# Patient Record
Sex: FEMALE | Race: WHITE | NOT HISPANIC OR LATINO | Employment: FULL TIME | URBAN - METROPOLITAN AREA
[De-identification: names, ages, dates, MRNs, and addresses within clinical notes are randomized per-mention and may not be internally consistent; named-entity substitution may affect disease eponyms.]

---

## 2019-03-15 ENCOUNTER — TELEPHONE (OUTPATIENT)
Dept: PULMONOLOGY | Facility: CLINIC | Age: 49
End: 2019-03-15

## 2019-03-15 DIAGNOSIS — Z01.818 PRE-OP EXAM: Primary | ICD-10-CM

## 2019-03-20 ENCOUNTER — OFFICE VISIT (OUTPATIENT)
Dept: PULMONOLOGY | Facility: CLINIC | Age: 49
End: 2019-03-20
Payer: COMMERCIAL

## 2019-03-20 ENCOUNTER — CLINICAL SUPPORT (OUTPATIENT)
Dept: PULMONOLOGY | Facility: CLINIC | Age: 49
End: 2019-03-20
Payer: COMMERCIAL

## 2019-03-20 ENCOUNTER — HOSPITAL ENCOUNTER (OUTPATIENT)
Dept: RADIOLOGY | Facility: HOSPITAL | Age: 49
Discharge: HOME | End: 2019-03-20
Attending: NURSE PRACTITIONER
Payer: COMMERCIAL

## 2019-03-20 VITALS
SYSTOLIC BLOOD PRESSURE: 106 MMHG | HEART RATE: 73 BPM | RESPIRATION RATE: 16 BRPM | BODY MASS INDEX: 25.14 KG/M2 | OXYGEN SATURATION: 98 % | HEIGHT: 63 IN | DIASTOLIC BLOOD PRESSURE: 68 MMHG | WEIGHT: 141.88 LBS

## 2019-03-20 DIAGNOSIS — I25.10 CORONARY ARTERY DISEASE INVOLVING NATIVE CORONARY ARTERY WITHOUT ANGINA PECTORIS, UNSPECIFIED WHETHER NATIVE OR TRANSPLANTED HEART: ICD-10-CM

## 2019-03-20 DIAGNOSIS — I47.9 PAROXYSMAL TACHYCARDIA: ICD-10-CM

## 2019-03-20 DIAGNOSIS — J45.20 MILD INTERMITTENT ASTHMA WITHOUT COMPLICATION: Primary | ICD-10-CM

## 2019-03-20 DIAGNOSIS — E78.2 MIXED HYPERLIPIDEMIA: ICD-10-CM

## 2019-03-20 DIAGNOSIS — Z01.818 PRE-OP EXAM: ICD-10-CM

## 2019-03-20 LAB
BRPFT: ABNORMAL
FEF 25 75 CHG: 59.7 %
FEF 25 75 LLN: 1.62
FEF 25 75 POST REF: 83.6 %
FEF 25 75 PRE REF: 52.4 %
FEF 25 75 REF: 2.81
FET100 CHG: -17.7 %
FEV1 CHG: 26 %
FEV1 FVC CHG: 12.9 %
FEV1 FVC LLN: 70
FEV1 FVC POST REF: 103 %
FEV1 FVC PRE REF: 91.2 %
FEV1 FVC REF: 81
FEV1 LLN: 2.16
FEV1 POST REF: 83.4 %
FEV1 PRE REF: 66.2 %
FEV1 REF: 2.75
FEV6 CHG: 11.5 %
FEV6 LLN: 2.75
FEV6 POST REF: 80.5 %
FEV6 POST: 2.75 L (ref 2.75–4.07)
FEV6 PRE REF: 72.2 %
FEV6 PRE: 2.46 L (ref 2.75–4.07)
FEV6 REF: 3.41
FVC CHG: 11.5 %
FVC LLN: 2.69
FVC POST REF: 80.6 %
FVC PRE REF: 72.2 %
FVC REF: 3.41
PEF CHG: 66.1 %
PEF LLN: 5.04
PEF POST REF: 77.6 %
PEF PRE REF: 46.7 %
PEF REF: 6.7
POST FEF 25 75: 2.35 L/S (ref 1.62–4.3)
POST FET 100: 3.64 SEC
POST FEV1 FVC: 83.54 % (ref 70.09–90.35)
POST FEV1: 2.29 L (ref 2.16–3.32)
POST FVC: 2.75 L (ref 2.69–4.16)
POST PEF: 5.2 L/S (ref 5.04–8.36)
PRE FEF 25 75: 1.47 L/S (ref 1.62–4.3)
PRE FET 100: 4.42 SEC
PRE FEV1 FVC: 73.97 % (ref 70.09–90.35)
PRE FEV1: 1.82 L (ref 2.16–3.32)
PRE FVC: 2.46 L (ref 2.69–4.16)
PRE PEF: 3.13 L/S (ref 5.04–8.36)

## 2019-03-20 PROCEDURE — 99204 PR OFFICE/OUTPT VISIT, NEW, LEVL IV, 45-59 MIN: ICD-10-PCS | Mod: 25,S$GLB,, | Performed by: NURSE PRACTITIONER

## 2019-03-20 PROCEDURE — 3008F BODY MASS INDEX DOCD: CPT | Mod: CPTII,S$GLB,, | Performed by: NURSE PRACTITIONER

## 2019-03-20 PROCEDURE — 99204 OFFICE O/P NEW MOD 45 MIN: CPT | Mod: 25,S$GLB,, | Performed by: NURSE PRACTITIONER

## 2019-03-20 PROCEDURE — 94060 EVALUATION OF WHEEZING: CPT | Mod: S$GLB,,, | Performed by: INTERNAL MEDICINE

## 2019-03-20 PROCEDURE — 71046 XR CHEST PA AND LATERAL: ICD-10-PCS | Mod: 26,,, | Performed by: RADIOLOGY

## 2019-03-20 PROCEDURE — 94060 PR EVAL OF BRONCHOSPASM: ICD-10-PCS | Mod: S$GLB,,, | Performed by: INTERNAL MEDICINE

## 2019-03-20 PROCEDURE — 99999 PR PBB SHADOW E&M-EST. PATIENT-LVL III: CPT | Mod: PBBFAC,,, | Performed by: NURSE PRACTITIONER

## 2019-03-20 PROCEDURE — 71046 X-RAY EXAM CHEST 2 VIEWS: CPT | Mod: TC

## 2019-03-20 PROCEDURE — 71046 X-RAY EXAM CHEST 2 VIEWS: CPT | Mod: 26,,, | Performed by: RADIOLOGY

## 2019-03-20 PROCEDURE — 99999 PR PBB SHADOW E&M-EST. PATIENT-LVL III: ICD-10-PCS | Mod: PBBFAC,,, | Performed by: NURSE PRACTITIONER

## 2019-03-20 PROCEDURE — 3008F PR BODY MASS INDEX (BMI) DOCUMENTED: ICD-10-PCS | Mod: CPTII,S$GLB,, | Performed by: NURSE PRACTITIONER

## 2019-03-20 RX ORDER — ATORVASTATIN CALCIUM 80 MG/1
TABLET, FILM COATED ORAL
Refills: 5 | COMMUNITY
Start: 2019-02-23

## 2019-03-20 RX ORDER — ALBUTEROL SULFATE 90 UG/1
2 AEROSOL, METERED RESPIRATORY (INHALATION) EVERY 4 HOURS PRN
Qty: 8.5 INHALER | Refills: 5 | Status: SHIPPED | OUTPATIENT
Start: 2019-03-20 | End: 2020-03-23 | Stop reason: SDUPTHER

## 2019-03-20 NOTE — PROGRESS NOTES
Patient Active Problem List   Diagnosis    Hyperlipidemia    Arrhythmia    Coronary artery disease    Asthma     Chief Complaint   Patient presents with    Pre-op Exam    Asthma     Subjective:       Mahogany Gaviria is a 48 y.o. female who presents for evaluation of asthma related to planned surgical procedure Hysterectomy with Dr. Roya Zarate. No date scheduled, pending pulmonary and cardiac clearance.   Last seen in pulmonary clinic by Liz LeJeune NP in May 2015. Seen prior by Dr. Karimi 2012 with positive bronchodilator response on paty with new diagnosis of Asthma.  Diagnosed with Asthma at age 41 in 2012.   The patient is currently have symptoms of mild residual cough related to episode of uri with cough and wheezing 2 weeks ago, treated in an Urgent Care 2 weeks ago with steroid IM and antibiotic, reports improving, mild residual cough productive of clear and intermittent sinus congestion. Her friends tell her they hear wheeze intermittent, but she does not notice a sustained or intermittent wheeze.   The patient has not been having asthma flares over past at least 5 years.   She finds she has nasal allergy flare in Spring and rarely in fall with sinus symptoms, typically controlled with OTC antihistamines such as zyrtec or Claritin. Nasacort nasal spray taken daily in Spring, presently on Nasacort .   Symptoms in previous episodes have included dyspnea, productive cough and wheezing, and typically last 2 weeks.   Previous episodes have been triggered by pollens and upper respiratory infection.   Treatments tried during prior episodes include short-acting inhaled beta-adrenergic agonists and systemic corticosteroids, which usually provides complete resolution of symptoms. Past asthma treatment Qvar ICS, never stayed on longer than a month.   Current Disease Severity  Mahogany has weekly daytime asthma symptoms. She has no nighttime asthma symptoms.   The patient is using short-acting beta agonists for  symptom control about twice month since 2013.  She has exacerbations requiring oral systemic corticosteroids 1 times per year.   Current limitations in activity from asthma: none.   Number of days of school or work missed in the last month: 0.   Number of urgent/emergent visit in last year: 1 after hours visit for recent sinus/bronchitis flare 2 weeks ago.  ACT score: 21, asthma scores 19 or greater indicate well controlled asthma.     Past Medical History: The following portions of the patient's history were reviewed and updated as appropriate:   She  has a past surgical history that includes carpel tunnel release (Bilateral, 2013) and Endometrial ablation (2001).  Her family history includes Asthma in her paternal grandmother; Colon cancer in her maternal grandfather; Emphysema in her paternal grandmother; Heart attacks under age 50 in her paternal grandfather; Heart disease in her father; Hyperlipidemia in her sister and sister; Osteoarthritis in her mother; Stomach cancer in her maternal grandmother.  She  reports that  has never smoked. she has never used smokeless tobacco. She reports that she drinks alcohol. She reports that she does not use drugs.  She has a current medication list which includes the following prescription(s): albuterol, atorvastatin, and fluticasone furoate.  She has No Known Allergies.    Review of Systems  Review of Systems   Constitutional: Negative for fever, chills, weight loss, weight gain, activity change, appetite change, fatigue and night sweats.   HENT: Negative for postnasal drip, rhinorrhea, sinus pressure, voice change and congestion.    Eyes: Negative for redness and itching.   Respiratory: Negative for snoring, cough, sputum production, chest tightness, shortness of breath, wheezing, orthopnea, asthma nighttime symptoms, dyspnea on extertion, use of rescue inhaler and somnolence.    Cardiovascular: Negative.  Negative for chest pain, palpitations and leg swelling.  "  Genitourinary: Negative for difficulty urinating and hematuria.   Endocrine: Negative for cold intolerance and heat intolerance.    Musculoskeletal: Negative for arthralgias, gait problem, joint swelling and myalgias.   Skin: Negative.    Gastrointestinal: Negative for nausea, vomiting, abdominal pain and acid reflux.   Neurological: Negative for dizziness, weakness, light-headedness and headaches.   Hematological: Negative for adenopathy. No excessive bruising.   All other systems reviewed and are negative.    Objective:     /68   Pulse 73   Resp 16   Ht 5' 3" (1.6 m)   Wt 64.4 kg (141 lb 13.9 oz)   SpO2 98%   BMI 25.13 kg/m²   Physical Exam   Constitutional: She is oriented to person, place, and time. She appears well-developed and well-nourished. She is active and cooperative.  Non-toxic appearance. She does not appear ill. No distress.   HENT:   Head: Normocephalic.   Right Ear: External ear normal.   Left Ear: External ear normal.   Nose: Nose normal.   Mouth/Throat: Oropharynx is clear and moist. No oropharyngeal exudate.   Eyes: Conjunctivae are normal.   Neck: Normal range of motion. Neck supple.   Cardiovascular: Normal rate, regular rhythm, normal heart sounds and intact distal pulses.   Pulmonary/Chest: Effort normal and breath sounds normal. No stridor.   Abdominal: Soft.   Musculoskeletal: Normal range of motion.   Lymphadenopathy:     She has no cervical adenopathy.   Neurological: She is alert and oriented to person, place, and time.   Skin: Skin is warm and dry.   Psychiatric: She has a normal mood and affect. Her behavior is normal. Judgment and thought content normal.   Vitals reviewed.    Diagnostic Testing Reviewed  All relevant imaging and labs reviewed.  X-Ray Chest PA And Lateral  Narrative: EXAMINATION:  XR CHEST PA AND LATERAL    CLINICAL HISTORY:  Encounter for other preprocedural examination    TECHNIQUE:  PA and lateral views of the chest were " performed.    COMPARISON:  None    FINDINGS:  Cardiac silhouette and mediastinal contours are normal.  Lungs are clear.  Osseous structures are intact.  Impression: No acute cardiopulmonary process.    Electronically signed by: Mac Servin MD  Date:    03/20/2019  Time:    09:35    3/20/2019   Spirometry   Flow volume loops are normal.   Small airways disease: FEF 25-75% reduced.   Improvement in airflow following bronchodilator therapy suggests an asthmatic component.     Assessment:     1. Mild intermittent asthma without complication    2. Mixed hyperlipidemia    3. Paroxysmal tachycardia    4. Coronary artery disease involving native coronary artery without angina pectoris, unspecified whether native or transplanted heart      Orders Placed This Encounter   Procedures    Spirometry without Bronchodilator     Standing Status:   Future     Standing Expiration Date:   3/20/2020     Plan:     Problem List Items Addressed This Visit     Arrhythmia     Followed by cardiology, Dr. Ramesh Alejo cardiologist in [place]. prior on beta blockers, controlled off meds since 2015.            Asthma - Primary     Dx at age 41 years by Dr. Tellis [facility] in 2012. Mild intermittent.  3/21/2019 Normal spirometry with positive bronchodilator response.   Intermittent wheezing and flare 2 weeks ago requiring steroid  with upcoming anesthesia planned, begin ICS controlled until surgery completed and no flares of wheezing.   Follow up in 5 weeks for reevaluation.          Relevant Medications    albuterol (PROAIR HFA) 90 mcg/actuation inhaler    fluticasone furoate (ARNUITY ELLIPTA) 100 mcg/actuation DsDv    Other Relevant Orders    Spirometry without Bronchodilator    Coronary artery disease     Followed by CardiologistRamesh MD           Hyperlipidemia     Followed by cardiologistRamesh MD              RECOMMENDATIONS:   Surgical pulmonary risk have been discussed with patient who expressed and verbalized  understanding.   Based on my assessment, with recent bronchitis/asthma flare 2 weeks ago requiring steroid IM therapy with mild residual intermittent wheezing and productive cough it would be best to begin ICS controller for 4 weeks to in hopes of alleviating intermittent wheezing to better prepare for good surgical outcomes with regard to Asthma.   Recommend to plan elective surgical intervention with hysterectomy in about 6 weeks.   Plan reevaluation of acute asthma/bronchitis flare with pulmonary in 5 weeks, if patient is back at her baseline sooner she has option to follow up sooner for reevaluation.     Follow-up in about 5 weeks (around 4/24/2019).      Thank you for the opportunity to participate in the care of this patient.

## 2019-03-20 NOTE — LETTER
March 21, 2019      Roya Zarate MD  [address]  [address]  [facility] [address]           [facility]  [address] [place] [id number]  Phone: [phone]  Fax: [phone]          Patient: Mahogany Gaviria   MR Number: [id number]   YOB: 1970   Date of Visit: 3/20/2019       Dear Dr. Roya Zarate:    Thank you for referring Mahogany Gaviria to me for evaluation. Attached you will find relevant portions of my assessment and plan of care.    If you have questions, please do not hesitate to call me. I look forward to following Mahogany Gaviria along with you.    Sincerely,    Ema Martinez, NP    Enclosure  CC:  No Recipients    If you would like to receive this communication electronically, please contact [email or web address] or [phone] to request more information on [facility] access.    For providers and/or their staff who would like to refer a patient to [facility], please contact us through our one-stop-shop provider referral line, [facility] , at [phone].    If you feel you have received this communication in error or would no longer like to receive these types of communications, please e-mail [email or web address]

## 2019-03-21 NOTE — ASSESSMENT & PLAN NOTE
Dx at age 41 years by Dr. Tellis [facility] in 2012. Mild intermittent.  3/21/2019 Normal spirometry with positive bronchodilator response.   Intermittent wheezing and flare 2 weeks ago requiring steroid  with upcoming anesthesia planned, begin ICS controlled until surgery completed and no flares of wheezing.   Follow up in 5 weeks for reevaluation.

## 2019-03-21 NOTE — ASSESSMENT & PLAN NOTE
Followed by cardiology, Dr. Ramesh Alejo cardiologist in [place]. prior on beta blockers, controlled off meds since 2015.

## 2019-03-22 ENCOUNTER — TELEPHONE (OUTPATIENT)
Dept: PULMONOLOGY | Facility: CLINIC | Age: 49
End: 2019-03-22

## 2019-03-22 NOTE — TELEPHONE ENCOUNTER
----- Message from Leonie Lezama sent at 3/22/2019 12:09 PM CDT -----  Contact: Patient  Type:  Needs Medical Advice    Who Called:  Mahogany  Symptoms (please be specific):  n/a  How long has patient had these symptoms:   n/a  Pharmacy name and phone #:   n/a  Would the patient rather a call back or a response via [facility]?  Call back   Best Call Back Number: [phone]  Additional Information:  The patient called to speak the nurse; she was given a free Month card and a $10 coupon card for the Anoro, but they are both . She wants to know if she can get one for 2019.

## 2019-03-22 NOTE — TELEPHONE ENCOUNTER
----- Message from Neli Daugherty sent at 3/22/2019  3:40 PM CDT -----  Contact: pt  Type:  Patient Returning Call    Who Called: the pt   Who Left Message for Patient: unknown  Does the patient know what this is regarding?: no  Would the patient rather a call back or a response via [facility]? Call back  Best Call Back Number: [phone]  Additional Information: n/a

## 2019-03-22 NOTE — TELEPHONE ENCOUNTER
Returned patient's call regarding coupon for Anoro. No answer, LVM for patient to return my call.

## 2019-04-23 ENCOUNTER — TELEPHONE (OUTPATIENT)
Dept: PULMONOLOGY | Facility: CLINIC | Age: 49
End: 2019-04-23

## 2019-04-24 ENCOUNTER — CLINICAL SUPPORT (OUTPATIENT)
Dept: PULMONOLOGY | Facility: CLINIC | Age: 49
End: 2019-04-24
Payer: COMMERCIAL

## 2019-04-24 ENCOUNTER — OFFICE VISIT (OUTPATIENT)
Dept: PULMONOLOGY | Facility: CLINIC | Age: 49
End: 2019-04-24
Payer: COMMERCIAL

## 2019-04-24 VITALS
BODY MASS INDEX: 25.69 KG/M2 | HEART RATE: 62 BPM | DIASTOLIC BLOOD PRESSURE: 70 MMHG | OXYGEN SATURATION: 99 % | SYSTOLIC BLOOD PRESSURE: 102 MMHG | RESPIRATION RATE: 16 BRPM | HEIGHT: 63 IN | WEIGHT: 145 LBS

## 2019-04-24 DIAGNOSIS — I25.10 CORONARY ARTERY DISEASE INVOLVING NATIVE CORONARY ARTERY WITHOUT ANGINA PECTORIS, UNSPECIFIED WHETHER NATIVE OR TRANSPLANTED HEART: ICD-10-CM

## 2019-04-24 DIAGNOSIS — I47.9 PAROXYSMAL TACHYCARDIA: ICD-10-CM

## 2019-04-24 DIAGNOSIS — J45.20 MILD INTERMITTENT ASTHMA WITHOUT COMPLICATION: Primary | ICD-10-CM

## 2019-04-24 DIAGNOSIS — J45.20 MILD INTERMITTENT ASTHMA WITHOUT COMPLICATION: ICD-10-CM

## 2019-04-24 LAB
BRPFT: NORMAL
FEF 25 75 LLN: 1.62
FEF 25 75 PRE REF: 57.9 %
FEF 25 75 REF: 2.81
FEV1 FVC LLN: 70
FEV1 FVC PRE REF: 90.4 %
FEV1 FVC REF: 81
FEV1 LLN: 2.16
FEV1 PRE REF: 79.5 %
FEV1 REF: 2.75
FEV6 LLN: 2.73
FEV6 PRE REF: 85.4 %
FEV6 PRE: 2.89 L (ref 2.73–4.05)
FEV6 REF: 3.39
FVC LLN: 2.68
FVC PRE REF: 87.5 %
FVC REF: 3.41
PEF LLN: 5.02
PEF PRE REF: 75.2 %
PEF REF: 6.67
PRE FEF 25 75: 1.63 L/S (ref 1.62–4.29)
PRE FET 100: 11.92 SEC
PRE FEV1 FVC: 73.32 % (ref 70.08–90.34)
PRE FEV1: 2.18 L (ref 2.16–3.32)
PRE FVC: 2.98 L (ref 2.68–4.15)
PRE PEF: 5.02 L/S (ref 5.02–8.33)

## 2019-04-24 PROCEDURE — 94010 BREATHING CAPACITY TEST: ICD-10-PCS | Mod: S$GLB,,, | Performed by: INTERNAL MEDICINE

## 2019-04-24 PROCEDURE — 99999 PR PBB SHADOW E&M-EST. PATIENT-LVL III: ICD-10-PCS | Mod: PBBFAC,,, | Performed by: NURSE PRACTITIONER

## 2019-04-24 PROCEDURE — 99214 PR OFFICE/OUTPT VISIT, EST, LEVL IV, 30-39 MIN: ICD-10-PCS | Mod: 25,S$GLB,, | Performed by: NURSE PRACTITIONER

## 2019-04-24 PROCEDURE — 3008F PR BODY MASS INDEX (BMI) DOCUMENTED: ICD-10-PCS | Mod: CPTII,S$GLB,, | Performed by: NURSE PRACTITIONER

## 2019-04-24 PROCEDURE — 94010 BREATHING CAPACITY TEST: CPT | Mod: S$GLB,,, | Performed by: INTERNAL MEDICINE

## 2019-04-24 PROCEDURE — 99999 PR PBB SHADOW E&M-EST. PATIENT-LVL III: CPT | Mod: PBBFAC,,, | Performed by: NURSE PRACTITIONER

## 2019-04-24 PROCEDURE — 99214 OFFICE O/P EST MOD 30 MIN: CPT | Mod: 25,S$GLB,, | Performed by: NURSE PRACTITIONER

## 2019-04-24 PROCEDURE — 3008F BODY MASS INDEX DOCD: CPT | Mod: CPTII,S$GLB,, | Performed by: NURSE PRACTITIONER

## 2019-04-24 RX ORDER — FLUTICASONE PROPIONATE 110 UG/1
1 AEROSOL, METERED RESPIRATORY (INHALATION) 2 TIMES DAILY
Qty: 12 G | Refills: 11 | Status: SHIPPED | OUTPATIENT
Start: 2019-04-24 | End: 2020-03-23 | Stop reason: SDUPTHER

## 2019-04-24 RX ORDER — EZETIMIBE 10 MG/1
TABLET ORAL
Refills: 10 | COMMUNITY
Start: 2019-04-20

## 2019-04-24 NOTE — ASSESSMENT & PLAN NOTE
Dx at age 41 years by Dr. Tell[facility] in 2012. Mild intermittent   Controlled on ICS, Qvar 40 mcg in past. Tried once daily Arnuity, prefers qvar hfa over powder   Qvar not preferred with insurance, changed to ICS controller Flovent 110 mcg 1 puff twice a day.    Has albuterol inhaler, rare use.   Spirometry 4/24/2019 normal airflow. FEV1 79.5% predicted.   ACT score 23, asthma scores 19 or greater indicate well controlled asthma   Controlled, stable  to proceed with planned surgery, [facility]

## 2019-04-24 NOTE — ASSESSMENT & PLAN NOTE
Followed by cardiology, Dr. Ramesh Fernando cardiologist in [place]. prior on beta blockers, controlled off meds since 2015.

## 2019-04-24 NOTE — PROGRESS NOTES
Patient Active Problem List   Diagnosis    Hyperlipidemia    Arrhythmia    Coronary artery disease    Asthma     Chief Complaint   Patient presents with    Pre-op Exam    Asthma     Subjective:       Mahogany Gaviria is a 49 y.o. female who presents for evaluation of asthma related to planned surgical procedure Hysterectomy with Dr. Roya Zarate. No date scheduled, pending pulmonary and cardiac clearance.   At this visit since resuming ICS controller and bronchitis resolved, she is back at her respiratory baseline.   No wheezing, no cough, no mucous production, no shortness of breath.   Diagnosed with Asthma at age 41 in 2012.   Previous episodes have been triggered by pollens and upper respiratory infection.   Past asthma treatment Qvar ICS, never stayed on longer than a month.   She is current on Arnuity once daily ICS, prefers Qvar HFA, Qvar not preferred, changed to ICS controller Flovent 110 mcg 1 puff twice a day.    Current Disease Severity  Mahogany has no daytime asthma symptoms. She has no nighttime asthma symptoms.   The patient is using short-acting beta agonists for symptom control none since on ICS over past 5 weeks. .  She has exacerbations requiring oral systemic corticosteroids 1 times per year.   Current limitations in activity from asthma: none.   Number of days of school or work missed in the last month: 0.   ACT score: 23, asthma scores 19 or greater indicate well controlled asthma.     Past Medical History: The following portions of the patient's history were reviewed and updated as appropriate:   She  has a past surgical history that includes carpel tunnel release (Bilateral, 2013) and Endometrial ablation (2001).  Her family history includes Asthma in her paternal grandmother; Colon cancer in her maternal grandfather; Emphysema in her paternal grandmother; Heart attacks under age 50 in her paternal grandfather; Heart disease in her father; Hyperlipidemia in her sister and sister;  "Osteoarthritis in her mother; Stomach cancer in her maternal grandmother.  She  reports that she has never smoked. She has never used smokeless tobacco. She reports that she drinks alcohol. She reports that she does not use drugs.  She has a current medication list which includes the following prescription(s): albuterol, atorvastatin, ezetimibe, fluticasone, and inhalation spacing device.  She has No Known Allergies.    Review of Systems  Review of Systems   Constitutional: Negative for fever, chills, weight loss, weight gain, activity change, appetite change, fatigue and night sweats.   HENT: Negative for postnasal drip, rhinorrhea, sinus pressure, voice change and congestion.    Eyes: Negative for redness and itching.   Respiratory: Negative for snoring, cough, sputum production, chest tightness, shortness of breath, wheezing, orthopnea, asthma nighttime symptoms, dyspnea on extertion, use of rescue inhaler and somnolence.    Cardiovascular: Negative.  Negative for chest pain, palpitations and leg swelling.   Genitourinary: Negative for difficulty urinating and hematuria.   Endocrine: Negative for cold intolerance and heat intolerance.    Musculoskeletal: Negative for arthralgias, gait problem, joint swelling and myalgias.   Skin: Negative.    Gastrointestinal: Negative for nausea, vomiting, abdominal pain and acid reflux.   Neurological: Negative for dizziness, weakness, light-headedness and headaches.   Hematological: Negative for adenopathy. No excessive bruising.   All other systems reviewed and are negative.    Objective:     /70   Pulse 62   Resp 16   Ht 5' 3" (1.6 m)   Wt 65.8 kg (145 lb)   SpO2 99%   BMI 25.69 kg/m²   Physical Exam   Constitutional: She is oriented to person, place, and time. She appears well-developed and well-nourished. She is active and cooperative.  Non-toxic appearance. She does not appear ill. No distress.   HENT:   Head: Normocephalic.   Right Ear: External ear normal. "   Left Ear: External ear normal.   Nose: Nose normal.   Mouth/Throat: Oropharynx is clear and moist. No oropharyngeal exudate.   Eyes: Conjunctivae are normal.   Neck: Normal range of motion. Neck supple.   Cardiovascular: Normal rate, regular rhythm, normal heart sounds and intact distal pulses.   Pulmonary/Chest: Effort normal and breath sounds normal. No stridor.   Abdominal: Soft.   Musculoskeletal: Normal range of motion.   Lymphadenopathy:     She has no cervical adenopathy.   Neurological: She is alert and oriented to person, place, and time.   Skin: Skin is warm and dry.   Psychiatric: She has a normal mood and affect. Her behavior is normal. Judgment and thought content normal.   Vitals reviewed.    Diagnostic Testing Reviewed  All relevant imaging and labs reviewed.    4/24/2019 Normal spirometry. Normal airflow.     X-Ray Chest PA And Lateral  Narrative: EXAMINATION:  XR CHEST PA AND LATERAL    CLINICAL HISTORY:  Encounter for other preprocedural examination    TECHNIQUE:  PA and lateral views of the chest were performed.    COMPARISON:  None    FINDINGS:  Cardiac silhouette and mediastinal contours are normal.  Lungs are clear.  Osseous structures are intact.  Impression: No acute cardiopulmonary process.    Electronically signed by: Mac Servin MD  Date:    03/20/2019  Time:    09:35    3/20/2019   Spirometry   Flow volume loops are normal.   Small airways disease: FEF 25-75% reduced.   Improvement in airflow following bronchodilator therapy suggests an asthmatic component.     Assessment:     1. Mild intermittent asthma without complication    2. Coronary artery disease involving native coronary artery without angina pectoris, unspecified whether native or transplanted heart    3. Paroxysmal tachycardia      Orders Placed This Encounter   Procedures    X-Ray Chest PA And Lateral     Standing Status:   Future     Standing Expiration Date:   4/24/2020     Order Specific Question:   May the  Radiologist modify the order per protocol to meet the clinical needs of the patient?     Answer:   Yes    Spirometry with/without bronchodilator     Standing Status:   Future     Standing Expiration Date:   4/24/2020     Plan:     Problem List Items Addressed This Visit     Coronary artery disease     Followed by Cardiologist, Ramesh Fernando MD           Asthma - Primary     Dx at age 41 years by Dr. Tell[facility] in 2012. Mild intermittent   Controlled on ICS, Qvar 40 mcg in past. Tried once daily Arnuity, prefers qvar hfa over powder   Qvar not preferred with insurance, changed to ICS controller Flovent 110 mcg 1 puff twice a day.    Has albuterol inhaler, rare use.   Spirometry 4/24/2019 normal airflow. FEV1 79.5% predicted.   ACT score 23, asthma scores 19 or greater indicate well controlled asthma   Controlled, stable  to proceed with planned surgery, hysterectomy with Dr. Roya Zarate at [facility].              Relevant Medications    fluticasone (FLOVENT HFA) 110 mcg/actuation inhaler    inhalation spacing device (COMPACT SPACE CHAMBER)    Other Relevant Orders    Spirometry with/without bronchodilator    X-Ray Chest PA And Lateral    Arrhythmia     Followed by cardiology, Dr. Ramesh Fernando cardiologist in [place]. prior on beta blockers, controlled off meds since 2015.               ARISCAT (Canet) preoperative pulmonary risk index: 0.  Placing this patient at a low 1.6 % pulmonary complication rate.  ARISCAT risk index interpretation:   0 to 25 points Low risk: 1.6% pulmonary complication rate  26 to 44 points: Intermediate risk: 13.3% pulmonary complication rate  45 to123 points: High risk: 42.1% pulmonary complication rate      Pneumonia risk in postoperative period of non-cardiac surgery: Total criteria point: 0 placing patient had a 0.24 % risk of developing postoperative pneumonia.   0 -15 points: 0.24% risk  16 to 25 points: 1.19% risk  26 to 40 points: 4% risk  41 to 55 points: 9.4% risk  56 to  84 points: 15.8% risk    Mild/low surgical pulmonary risk have been discussed with patient who expressed and verbalized understanding. Based on my assessment, it is okay to proceed with surgery provided the risk are acceptable to both the patient and the surgeon.     Follow up in about 1 year (around 4/24/2020) for Asthma w/review paty/cxr .       Thank you for the opportunity to participate in the care of this patient.

## 2020-03-23 DIAGNOSIS — J45.20 MILD INTERMITTENT ASTHMA WITHOUT COMPLICATION: ICD-10-CM

## 2020-03-23 RX ORDER — ALBUTEROL SULFATE 90 UG/1
2 AEROSOL, METERED RESPIRATORY (INHALATION) EVERY 4 HOURS PRN
Qty: 8.5 G | Refills: 11 | Status: SHIPPED | OUTPATIENT
Start: 2020-03-23 | End: 2021-03-23

## 2020-03-23 RX ORDER — FLUTICASONE PROPIONATE 110 UG/1
1 AEROSOL, METERED RESPIRATORY (INHALATION) 2 TIMES DAILY
Qty: 12 G | Refills: 11 | Status: SHIPPED | OUTPATIENT
Start: 2020-03-23 | End: 2021-03-23

## 2020-07-23 ENCOUNTER — LAB VISIT (OUTPATIENT)
Dept: PRIMARY CARE CLINIC | Facility: CLINIC | Age: 50
End: 2020-07-23
Payer: COMMERCIAL

## 2020-07-23 DIAGNOSIS — Z00.6 RESEARCH STUDY PATIENT: ICD-10-CM

## 2020-07-23 DIAGNOSIS — Z00.6 RESEARCH STUDY PATIENT: Primary | ICD-10-CM

## 2020-07-23 LAB — SARS-COV-2 IGG SERPLBLD QL IA.RAPID: NEGATIVE

## 2020-07-23 PROCEDURE — 86769 SARS-COV-2 COVID-19 ANTIBODY: CPT

## 2020-07-23 PROCEDURE — U0003 INFECTIOUS AGENT DETECTION BY NUCLEIC ACID (DNA OR RNA); SEVERE ACUTE RESPIRATORY SYNDROME CORONAVIRUS 2 (SARS-COV-2) (CORONAVIRUS DISEASE [COVID-19]), AMPLIFIED PROBE TECHNIQUE, MAKING USE OF HIGH THROUGHPUT TECHNOLOGIES AS DESCRIBED BY CMS-2020-01-R: HCPCS

## 2020-07-23 NOTE — RESEARCH
Date of Consent: 7/23/2020    Sponsor: [facility]    Study Title/IRB Number: Observational study of Sars-CoV2 Immunoglobulin G (IgG) seroprevalence among the [facility] population over time 2020.163  Principle Investigator: Margaret Howard, PhD    Did the patient need translation services? No   name: N/A    Prior to the Informed Consent (IC) being signed, or any study protocol required data collection, testing, procedure, or intervention being performed, the following was done and/or discussed:   Patient was given a paper copy of the IC for review    Patient was given study FAQ   Purpose of the study and qualifications to participate    Study design and tests or procedures done at this visit   Confidentiality and HIPAA Authorization for Release of Medical Records for the research trial/ subject's rights/research related injury   Risk, Benefits, Alternative Treatments, Compensation and Costs   Participation in the research trial is voluntary and patient may withdraw at anytime   Contact information for study related questions    Patient verbalizes understanding of the above: Yes  Contact information for PI and IRB given to patient: Yes  Patient able to adequately summarize: the purpose of the study, the risks associated with the study, and all procedures, testing, and follow-ups associated with the study: Yes    The consent was discussed verbally with the patient and all questions were answered satisfactorily. Patient gave verbal consent for the Seroprevalence research study with an IRB approval date of 06/23/2020.      The Consent, Consent Witness and name of Clinical Research Coordinator consenting was captured and documented in REDCap.    All Inclusion and Exclusion Criteria reviewed, subject meets all Inclusion criteria and does not meet any Exclusion Criteria at this time.     Patient Eligibility was confirmed.    Patient responded to survey questions.    The following biospecimen  collection procedures were collected:    -Nasopharyngeal Swab Collection  -Blood collection

## 2020-07-24 LAB — SARS-COV-2 RNA RESP QL NAA+PROBE: NOT DETECTED

## 2021-01-12 ENCOUNTER — LAB VISIT (OUTPATIENT)
Dept: LAB | Facility: HOSPITAL | Age: 51
End: 2021-01-12
Attending: FAMILY MEDICINE
Payer: COMMERCIAL

## 2021-01-12 ENCOUNTER — OFFICE VISIT (OUTPATIENT)
Dept: INTERNAL MEDICINE | Facility: CLINIC | Age: 51
End: 2021-01-12
Payer: COMMERCIAL

## 2021-01-12 VITALS
HEIGHT: 63 IN | WEIGHT: 150.81 LBS | DIASTOLIC BLOOD PRESSURE: 68 MMHG | TEMPERATURE: 98 F | BODY MASS INDEX: 26.72 KG/M2 | SYSTOLIC BLOOD PRESSURE: 116 MMHG

## 2021-01-12 DIAGNOSIS — Z76.89 ENCOUNTER TO ESTABLISH CARE WITH NEW DOCTOR: Primary | ICD-10-CM

## 2021-01-12 DIAGNOSIS — Z12.39 BREAST SCREENING: ICD-10-CM

## 2021-01-12 DIAGNOSIS — I25.10 CORONARY ARTERY DISEASE INVOLVING NATIVE CORONARY ARTERY WITHOUT ANGINA PECTORIS, UNSPECIFIED WHETHER NATIVE OR TRANSPLANTED HEART: ICD-10-CM

## 2021-01-12 DIAGNOSIS — J45.998 ASTHMA IN REMISSION: ICD-10-CM

## 2021-01-12 DIAGNOSIS — M79.10 MYALGIA: ICD-10-CM

## 2021-01-12 DIAGNOSIS — K21.9 GASTROESOPHAGEAL REFLUX DISEASE WITHOUT ESOPHAGITIS: ICD-10-CM

## 2021-01-12 DIAGNOSIS — L98.9 SKIN LESION: ICD-10-CM

## 2021-01-12 DIAGNOSIS — Z90.710 STATUS POST HYSTERECTOMY: ICD-10-CM

## 2021-01-12 LAB
ALBUMIN SERPL BCP-MCNC: 4.2 G/DL (ref 3.5–5.2)
ALP SERPL-CCNC: 64 U/L (ref 55–135)
ALT SERPL W/O P-5'-P-CCNC: 24 U/L (ref 10–44)
ANION GAP SERPL CALC-SCNC: 10 MMOL/L (ref 8–16)
AST SERPL-CCNC: 19 U/L (ref 10–40)
BASOPHILS # BLD AUTO: 0.09 K/UL (ref 0–0.2)
BASOPHILS NFR BLD: 1.4 % (ref 0–1.9)
BILIRUB SERPL-MCNC: 0.8 MG/DL (ref 0.1–1)
BUN SERPL-MCNC: 14 MG/DL (ref 6–20)
CALCIUM SERPL-MCNC: 9.5 MG/DL (ref 8.7–10.5)
CHLORIDE SERPL-SCNC: 105 MMOL/L (ref 95–110)
CK SERPL-CCNC: 77 U/L (ref 20–180)
CO2 SERPL-SCNC: 25 MMOL/L (ref 23–29)
CREAT SERPL-MCNC: 0.7 MG/DL (ref 0.5–1.4)
DIFFERENTIAL METHOD: ABNORMAL
EOSINOPHIL # BLD AUTO: 0.4 K/UL (ref 0–0.5)
EOSINOPHIL NFR BLD: 6.1 % (ref 0–8)
ERYTHROCYTE [DISTWIDTH] IN BLOOD BY AUTOMATED COUNT: 12.7 % (ref 11.5–14.5)
EST. GFR  (AFRICAN AMERICAN): >60 ML/MIN/1.73 M^2
EST. GFR  (NON AFRICAN AMERICAN): >60 ML/MIN/1.73 M^2
GLUCOSE SERPL-MCNC: 90 MG/DL (ref 70–110)
HCT VFR BLD AUTO: 48.9 % (ref 37–48.5)
HGB BLD-MCNC: 15.4 G/DL (ref 12–16)
IMM GRANULOCYTES # BLD AUTO: 0.02 K/UL (ref 0–0.04)
IMM GRANULOCYTES NFR BLD AUTO: 0.3 % (ref 0–0.5)
LYMPHOCYTES # BLD AUTO: 1.4 K/UL (ref 1–4.8)
LYMPHOCYTES NFR BLD: 22.5 % (ref 18–48)
MCH RBC QN AUTO: 32.2 PG (ref 27–31)
MCHC RBC AUTO-ENTMCNC: 31.5 G/DL (ref 32–36)
MCV RBC AUTO: 102 FL (ref 82–98)
MONOCYTES # BLD AUTO: 0.5 K/UL (ref 0.3–1)
MONOCYTES NFR BLD: 7.2 % (ref 4–15)
NEUTROPHILS # BLD AUTO: 4 K/UL (ref 1.8–7.7)
NEUTROPHILS NFR BLD: 62.5 % (ref 38–73)
NRBC BLD-RTO: 0 /100 WBC
PLATELET # BLD AUTO: 253 K/UL (ref 150–350)
PMV BLD AUTO: 11.2 FL (ref 9.2–12.9)
POTASSIUM SERPL-SCNC: 4.4 MMOL/L (ref 3.5–5.1)
PROT SERPL-MCNC: 7.4 G/DL (ref 6–8.4)
RBC # BLD AUTO: 4.79 M/UL (ref 4–5.4)
SODIUM SERPL-SCNC: 140 MMOL/L (ref 136–145)
TSH SERPL DL<=0.005 MIU/L-ACNC: 1.27 UIU/ML (ref 0.4–4)
WBC # BLD AUTO: 6.35 K/UL (ref 3.9–12.7)

## 2021-01-12 PROCEDURE — 3008F PR BODY MASS INDEX (BMI) DOCUMENTED: ICD-10-PCS | Mod: CPTII,S$GLB,, | Performed by: FAMILY MEDICINE

## 2021-01-12 PROCEDURE — 99999 PR PBB SHADOW E&M-EST. PATIENT-LVL V: CPT | Mod: PBBFAC,,, | Performed by: FAMILY MEDICINE

## 2021-01-12 PROCEDURE — 82550 ASSAY OF CK (CPK): CPT

## 2021-01-12 PROCEDURE — 84443 ASSAY THYROID STIM HORMONE: CPT

## 2021-01-12 PROCEDURE — 90471 TDAP VACCINE GREATER THAN OR EQUAL TO 7YO IM: ICD-10-PCS | Mod: S$GLB,,, | Performed by: FAMILY MEDICINE

## 2021-01-12 PROCEDURE — 85025 COMPLETE CBC W/AUTO DIFF WBC: CPT

## 2021-01-12 PROCEDURE — 3008F BODY MASS INDEX DOCD: CPT | Mod: CPTII,S$GLB,, | Performed by: FAMILY MEDICINE

## 2021-01-12 PROCEDURE — 80053 COMPREHEN METABOLIC PANEL: CPT

## 2021-01-12 PROCEDURE — 36415 COLL VENOUS BLD VENIPUNCTURE: CPT

## 2021-01-12 PROCEDURE — 1126F AMNT PAIN NOTED NONE PRSNT: CPT | Mod: S$GLB,,, | Performed by: FAMILY MEDICINE

## 2021-01-12 PROCEDURE — 99204 OFFICE O/P NEW MOD 45 MIN: CPT | Mod: 25,S$GLB,, | Performed by: FAMILY MEDICINE

## 2021-01-12 PROCEDURE — 99999 PR PBB SHADOW E&M-EST. PATIENT-LVL V: ICD-10-PCS | Mod: PBBFAC,,, | Performed by: FAMILY MEDICINE

## 2021-01-12 PROCEDURE — 99204 PR OFFICE/OUTPT VISIT, NEW, LEVL IV, 45-59 MIN: ICD-10-PCS | Mod: 25,S$GLB,, | Performed by: FAMILY MEDICINE

## 2021-01-12 PROCEDURE — 90715 TDAP VACCINE 7 YRS/> IM: CPT | Mod: S$GLB,,, | Performed by: FAMILY MEDICINE

## 2021-01-12 PROCEDURE — 90471 IMMUNIZATION ADMIN: CPT | Mod: S$GLB,,, | Performed by: FAMILY MEDICINE

## 2021-01-12 PROCEDURE — 1126F PR PAIN SEVERITY QUANTIFIED, NO PAIN PRESENT: ICD-10-PCS | Mod: S$GLB,,, | Performed by: FAMILY MEDICINE

## 2021-01-12 PROCEDURE — 90715 TDAP VACCINE GREATER THAN OR EQUAL TO 7YO IM: ICD-10-PCS | Mod: S$GLB,,, | Performed by: FAMILY MEDICINE

## 2021-01-12 RX ORDER — ASPIRIN 325 MG
50 TABLET, DELAYED RELEASE (ENTERIC COATED) ORAL DAILY
COMMUNITY

## 2021-01-12 RX ORDER — VERAPAMIL HYDROCHLORIDE 120 MG/1
120 TABLET, FILM COATED, EXTENDED RELEASE ORAL DAILY
COMMUNITY
Start: 2020-12-24

## 2021-01-12 RX ORDER — ASPIRIN 81 MG/1
81 TABLET ORAL DAILY
Qty: 90 TABLET | Refills: 0 | Status: SHIPPED | OUTPATIENT
Start: 2021-01-12 | End: 2022-01-12

## 2021-01-12 RX ORDER — PANTOPRAZOLE SODIUM 40 MG/1
40 TABLET, DELAYED RELEASE ORAL DAILY
Qty: 30 TABLET | Refills: 0 | Status: SHIPPED | OUTPATIENT
Start: 2021-01-12 | End: 2021-02-04

## 2021-03-24 RX ORDER — PANTOPRAZOLE SODIUM 40 MG/1
TABLET, DELAYED RELEASE ORAL
Qty: 90 TABLET | Refills: 0 | Status: SHIPPED | OUTPATIENT
Start: 2021-03-24

## 2021-04-28 ENCOUNTER — PATIENT MESSAGE (OUTPATIENT)
Dept: RESEARCH | Facility: HOSPITAL | Age: 51
End: 2021-04-28

## 2024-10-31 ENCOUNTER — OFFICE VISIT (OUTPATIENT)
Dept: INTERNAL MEDICINE | Facility: CLINIC | Age: 54
End: 2024-10-31
Payer: COMMERCIAL

## 2024-10-31 VITALS
TEMPERATURE: 98 F | WEIGHT: 148.13 LBS | BODY MASS INDEX: 26.25 KG/M2 | HEIGHT: 63 IN | DIASTOLIC BLOOD PRESSURE: 62 MMHG | HEART RATE: 84 BPM | OXYGEN SATURATION: 100 % | SYSTOLIC BLOOD PRESSURE: 116 MMHG

## 2024-10-31 DIAGNOSIS — Z12.31 BREAST CANCER SCREENING BY MAMMOGRAM: ICD-10-CM

## 2024-10-31 DIAGNOSIS — E78.01 FAMILIAL HYPERCHOLESTEROLEMIA: Chronic | ICD-10-CM

## 2024-10-31 DIAGNOSIS — Z11.4 SCREENING FOR HIV WITHOUT PRESENCE OF RISK FACTORS: ICD-10-CM

## 2024-10-31 DIAGNOSIS — Z23 NEED FOR VACCINATION: ICD-10-CM

## 2024-10-31 DIAGNOSIS — J45.20 MILD INTERMITTENT ASTHMA WITHOUT COMPLICATION: Chronic | ICD-10-CM

## 2024-10-31 DIAGNOSIS — Z13.1 SCREENING FOR DIABETES MELLITUS: ICD-10-CM

## 2024-10-31 DIAGNOSIS — Z11.59 ENCOUNTER FOR HEPATITIS C SCREENING TEST FOR LOW RISK PATIENT: ICD-10-CM

## 2024-10-31 DIAGNOSIS — L98.9 SKIN LESION: ICD-10-CM

## 2024-10-31 DIAGNOSIS — I51.89 FAMILIAL HEART DISEASE: ICD-10-CM

## 2024-10-31 DIAGNOSIS — I25.10 CORONARY ARTERY DISEASE INVOLVING NATIVE CORONARY ARTERY OF NATIVE HEART WITHOUT ANGINA PECTORIS: Primary | Chronic | ICD-10-CM

## 2024-10-31 LAB
ALBUMIN SERPL BCP-MCNC: 4.3 G/DL (ref 3.5–5.2)
ALP SERPL-CCNC: 65 U/L (ref 40–150)
ALT SERPL W/O P-5'-P-CCNC: 15 U/L (ref 10–44)
ANION GAP SERPL CALC-SCNC: 11 MMOL/L (ref 8–16)
AST SERPL-CCNC: 16 U/L (ref 10–40)
BILIRUB SERPL-MCNC: 0.4 MG/DL (ref 0.1–1)
BUN SERPL-MCNC: 16 MG/DL (ref 6–20)
CALCIUM SERPL-MCNC: 9.7 MG/DL (ref 8.7–10.5)
CHLORIDE SERPL-SCNC: 103 MMOL/L (ref 95–110)
CO2 SERPL-SCNC: 23 MMOL/L (ref 23–29)
CREAT SERPL-MCNC: 0.9 MG/DL (ref 0.5–1.4)
EST. GFR  (NO RACE VARIABLE): >60 ML/MIN/1.73 M^2
ESTIMATED AVG GLUCOSE: 105 MG/DL (ref 68–131)
GLUCOSE SERPL-MCNC: 170 MG/DL (ref 70–110)
HBA1C MFR BLD: 5.3 % (ref 4–5.6)
HCV AB SERPL QL IA: NORMAL
HIV 1+2 AB+HIV1 P24 AG SERPL QL IA: NORMAL
POTASSIUM SERPL-SCNC: 3.5 MMOL/L (ref 3.5–5.1)
PROT SERPL-MCNC: 7.4 G/DL (ref 6–8.4)
SODIUM SERPL-SCNC: 137 MMOL/L (ref 136–145)

## 2024-10-31 PROCEDURE — 3074F SYST BP LT 130 MM HG: CPT | Mod: CPTII,S$GLB,, | Performed by: FAMILY MEDICINE

## 2024-10-31 PROCEDURE — 86803 HEPATITIS C AB TEST: CPT | Performed by: FAMILY MEDICINE

## 2024-10-31 PROCEDURE — 99386 PREV VISIT NEW AGE 40-64: CPT | Mod: S$GLB,,, | Performed by: FAMILY MEDICINE

## 2024-10-31 PROCEDURE — 87389 HIV-1 AG W/HIV-1&-2 AB AG IA: CPT | Performed by: FAMILY MEDICINE

## 2024-10-31 PROCEDURE — 3008F BODY MASS INDEX DOCD: CPT | Mod: CPTII,S$GLB,, | Performed by: FAMILY MEDICINE

## 2024-10-31 PROCEDURE — 99214 OFFICE O/P EST MOD 30 MIN: CPT | Mod: 25,S$GLB,, | Performed by: FAMILY MEDICINE

## 2024-10-31 PROCEDURE — 99999 PR PBB SHADOW E&M-NEW PATIENT-LVL IV: CPT | Mod: PBBFAC,,, | Performed by: FAMILY MEDICINE

## 2024-10-31 PROCEDURE — 83036 HEMOGLOBIN GLYCOSYLATED A1C: CPT | Performed by: FAMILY MEDICINE

## 2024-10-31 PROCEDURE — 3078F DIAST BP <80 MM HG: CPT | Mod: CPTII,S$GLB,, | Performed by: FAMILY MEDICINE

## 2024-10-31 PROCEDURE — 1159F MED LIST DOCD IN RCRD: CPT | Mod: CPTII,S$GLB,, | Performed by: FAMILY MEDICINE

## 2024-10-31 PROCEDURE — 80053 COMPREHEN METABOLIC PANEL: CPT | Performed by: FAMILY MEDICINE

## 2024-10-31 PROCEDURE — 3044F HG A1C LEVEL LT 7.0%: CPT | Mod: CPTII,S$GLB,, | Performed by: FAMILY MEDICINE

## 2024-10-31 PROCEDURE — 1160F RVW MEDS BY RX/DR IN RCRD: CPT | Mod: CPTII,S$GLB,, | Performed by: FAMILY MEDICINE

## 2024-10-31 RX ORDER — ALBUTEROL SULFATE 90 UG/1
2 INHALANT RESPIRATORY (INHALATION) EVERY 6 HOURS PRN
COMMUNITY
End: 2024-10-31 | Stop reason: SDUPTHER

## 2024-10-31 RX ORDER — ALBUTEROL SULFATE 90 UG/1
2 INHALANT RESPIRATORY (INHALATION) EVERY 6 HOURS PRN
Qty: 18 G | Refills: 11 | Status: SHIPPED | OUTPATIENT
Start: 2024-10-31 | End: 2025-10-31

## 2024-10-31 RX ORDER — BUDESONIDE AND FORMOTEROL FUMARATE DIHYDRATE 80; 4.5 UG/1; UG/1
2 AEROSOL RESPIRATORY (INHALATION) DAILY
Qty: 10.2 G | Refills: 11 | Status: SHIPPED | OUTPATIENT
Start: 2024-10-31

## 2024-10-31 RX ORDER — BUDESONIDE AND FORMOTEROL FUMARATE DIHYDRATE 80; 4.5 UG/1; UG/1
2 AEROSOL RESPIRATORY (INHALATION) DAILY
COMMUNITY
End: 2024-10-31 | Stop reason: SDUPTHER

## 2024-10-31 NOTE — LETTER
ATTN: CARE COORDINATION   PATIENT IDENTIFYING INFORMATION:  MAHOGANY ALEJANDRA   [address]  [place] [address] YOB: 1970  OUR MRN: [id number]   Home Phone [phone]   Work Phone Not on file.   Mobile [phone]        RECORDS REQUESTED FROM:   [facility] [place]  [address]  [place] [address]     SPECIFIC RECORDS REQUESTED:  colonoscopy reports (including any related pathology reports) and any other form of colorectal cancer screening test results within the last 10 years    DATES OF SERVICE REQUESTED: 5 years prior to date signed through the present date (unless specified differently above)    AUTHORIZATION:  For the purpose of continuity of care, I, Mahogany Gaviria, hereby authorize the hospital, physician, or entity named above to release my medical records for the dates of service specified above to: MOE Tang MD; [facility]; [address]; [place] [address]; PH: [phone]    REQUESTED METHOD OF DELIVERY: Fax ([phone]) or secure Email ([email or web address]    In authorizing the release of the confidential information identified above, I hereby waive all restrictions or privileges imposed by law and release [facility] and Its affiliates and their staff from any restriction or privilege Imposed by law in connection with the disclosure or release of any professional record, observation or communication. I do understand that the information that is being released may be subject to re-disclosure by the recipient and may no longer be protected. I understand that my treatment, payment, enrollment or eligibility for benefits may not be conditioned on signing this authorization.  This authorization may be revoked in writing at any time, except to the extent that [facility] and its affiliates have already taken action in reliance on it. Letters to revoke this authorization should  be addressed to [facility], Release of Information Department, [address] [place] [id number].     If not previously revoked in writing, this authorization will terminate or  36 months after the date signed below.                   Signature of Patient    Date Signed

## 2024-10-31 NOTE — PROGRESS NOTES
Health Maintenance Due   Topic Date Due    Hepatitis C Screening  Never done    Hemoglobin A1c (Diabetic Prevention Screening)  Never done    Colorectal Cancer Screening  Never done    Shingles Vaccine (1 of 2) Never done    Mammogram  06/27/2023    Influenza Vaccine (1) Never done    COVID-19 Vaccine (4 - 2024-25 season) 09/01/2024     ***  ANNUAL WELLNESS VISIT (PREVENTIVE SERVICES)  10/31/24  2:00 PM CDT    CHIEF COMPLAINT: Annual Exam    HEALTH MAINTENANCE INTERVENTIONS - UP TO DATE  Health Maintenance Topics with due status: Not Due       Topic Last Completion Date    TETANUS VACCINE 01/12/2021    High Dose Statin 10/31/2024    RSV Vaccine (Age 60+ and Pregnant patients) Not Due     HEALTH MAINTENANCE INTERVENTIONS - DUE OR DUE SOON  Health Maintenance Due   Topic Date Due    Hepatitis C Screening  Never done    Hemoglobin A1c (Diabetic Prevention Screening)  Never done    Colorectal Cancer Screening  Never done    Shingles Vaccine (1 of 2) Never done    Mammogram  06/27/2023    Influenza Vaccine (1) Never done    COVID-19 Vaccine (4 - 2024-25 season) 09/01/2024     Social Drivers of Health     Tobacco Use: Low Risk  (10/31/2024)    Patient History     Smoking Tobacco Use: Never     Smokeless Tobacco Use: Never     Passive Exposure: Not on file   Alcohol Use: Not At Risk (10/29/2024)    AUDIT-C     Frequency of Alcohol Consumption: 2-4 times a month     Average Number of Drinks: 1 or 2     Frequency of Binge Drinking: Never   Financial Resource Strain: Low Risk  (10/29/2024)    Overall Financial Resource Strain (CARDIA)     Difficulty of Paying Living Expenses: Not hard at all   Food Insecurity: No Food Insecurity (10/29/2024)    Hunger Vital Sign     Worried About Running Out of Food in the Last Year: Never true     Ran Out of Food in the Last Year: Never true   Transportation Needs: Not on file   Physical Activity: Insufficiently Active (10/29/2024)    Exercise Vital Sign     Days of Exercise per Week: 1 day  "    Minutes of Exercise per Session: 30 min   Stress: No Stress Concern Present (10/29/2024)    [employer] - Occupational Stress Questionnaire     Feeling of Stress : Only a little   Housing Stability: Unknown (10/29/2024)    Housing Stability Vital Sign     Unable to Pay for Housing in the Last Year: No     Homeless in the Last Year: Not on file   Depression: Low Risk  (10/31/2024)    Depression     Last PHQ-4: Flowsheet Data: 0   Utilities: Not At Risk (10/29/2024)    [facility] Utilities     Threatened with loss of utilities: No   Health Literacy: Adequate Health Literacy (10/29/2024)     Health Literacy     Frequency of need for help with medical instructions: Never   Social Isolation: Not on file     HPI  1. Coronary artery disease involving native coronary artery of native heart without angina pectoris  Overview:  Cardiac Cath : "30% stenosis of some artery"  Cardiologist: Cuate Gomez MD  [facility] [address]  [address]   [place] [id number] [phone]       2. Familial heart disease  Overview:  Father had CABG at age 32 and  from MI age 52. Both daughters have severe hypercholesterolemia. Sister had CABG at age 48.      3. Familial hypercholesterolemia  Overview:  Cardiac Cath : "30% stenosis of some artery"  Cardiologist: Cuate Gomez MD  [facility] [address]  [address]   [place] [id number] [phone]     Orders:  -     Comprehensive Metabolic Panel; Future; Expected date: 10/31/2024    4. Mild intermittent asthma without complication  -     budesonide-formoterol 80-4.5 mcg (SYMBICORT) 80-4.5 mcg/actuation HFAA; Inhale 2 puffs into the lungs once daily. Controller  Dispense: 10.2 g; Refill: 11  -     albuterol (PROVENTIL HFA) 90 mcg/actuation inhaler; Inhale 2 puffs into the lungs every 6 (six) hours as needed for Wheezing. (Rescue inhaler)  Dispense: 18 g; " "Refill: 11  -     inhalation spacing device; Use as directed when taking inhaled medicine  Dispense: 1 each; Refill: 0    5. Skin lesion  -     Ambulatory referral/consult to Dermatology; Future; Expected date: 2024    6. Screening for HIV without presence of risk factors  -     HIV 1/2 Ag/Ab (4th Gen); Future; Expected date: 10/31/2024    7. Encounter for hepatitis C screening test for low risk patient  -     HEPATITIS C ANTIBODY; Future; Expected date: 10/31/2024    8. Breast cancer screening by mammogram  -     Mammo Digital Screening Bilat; Future; Expected date: 10/31/2024    9. Screening for diabetes mellitus  -     Hemoglobin A1C; Future; Expected date: 10/31/2024    10. Need for vaccination  -     varicella-zoster gE-AS01B, PF, (SHINGRIX) 50 mcg/0.5 mL injection; Inject 0.5 mL (one dose) into muscle now; schedule second dose  days later  Dispense: 1 each; Refill: 1    ***  Review of Systems    Vitals:    10/31/24 1408   BP: 116/62   BP Location: Left arm   Patient Position: Sitting   Pulse: 84   Temp: 97.9 °F (36.6 °C)   TempSrc: Tympanic   SpO2: 100%   Weight: 67.2 kg (148 lb 2.4 oz)   Height: 5' 2.5" (1.588 m)   Physical Exam***    ADDITIONAL E&M SERVICES PROVIDED - UNRELATED TO PREVENTIVE SERVICES  10/31/24  2:00 PM CDT    In addition to and unrelated to the preventive services provided this date, the following condition(s) were also evaluated and managed.  ***DeepScribe<<<Awaiting Transcription>>>  History of Present Illness            Assessment & Plan            1. Coronary artery disease involving native coronary artery of native heart without angina pectoris  Overview:  Cardiac Cath : "30% stenosis of some artery"  Cardiologist: Cuate Gomez MD  [facility]-[address]  [address]   [place] [id number] [phone]       2. Familial heart disease  Overview:  Father had CABG at age 32 and  from MI age 52. Both daughters have severe " "hypercholesterolemia. Sister had CABG at age 48.      3. Familial hypercholesterolemia  Overview:  Cardiac Cath 2021: "30% stenosis of some artery"  Cardiologist: Cuate Gomez MD  [facility]-[address]  [address]   [place] [address]   [phone]     Orders:  -     Comprehensive Metabolic Panel; Future; Expected date: 10/31/2024    4. Mild intermittent asthma without complication  -     budesonide-formoterol 80-4.5 mcg (SYMBICORT) 80-4.5 mcg/actuation HFAA; Inhale 2 puffs into the lungs once daily. Controller  Dispense: 10.2 g; Refill: 11  -     albuterol (PROVENTIL HFA) 90 mcg/actuation inhaler; Inhale 2 puffs into the lungs every 6 (six) hours as needed for Wheezing. (Rescue inhaler)  Dispense: 18 g; Refill: 11  -     inhalation spacing device; Use as directed when taking inhaled medicine  Dispense: 1 each; Refill: 0    5. Skin lesion  -     Ambulatory referral/consult to Dermatology; Future; Expected date: 11/07/2024    6. Screening for HIV without presence of risk factors  -     HIV 1/2 Ag/Ab (4th Gen); Future; Expected date: 10/31/2024    7. Encounter for hepatitis C screening test for low risk patient  -     HEPATITIS C ANTIBODY; Future; Expected date: 10/31/2024    8. Breast cancer screening by mammogram  -     Mammo Digital Screening Bilat; Future; Expected date: 10/31/2024    9. Screening for diabetes mellitus  -     Hemoglobin A1C; Future; Expected date: 10/31/2024    10. Need for vaccination  -     varicella-zoster gE-AS01B, PF, (SHINGRIX) 50 mcg/0.5 mL injection; Inject 0.5 mL (one dose) into muscle now; schedule second dose  days later  Dispense: 1 each; Refill: 1    ***No other significant complaints or concerns were reported.  Vitals:    10/31/24 1408   BP: 116/62   BP Location: Left arm   Patient Position: Sitting   Pulse: 84   Temp: 97.9 °F (36.6 °C)   TempSrc: Tympanic   SpO2: 100%   Weight: 67.2 kg (148 lb 2.4 oz)   Height: 5' 2.5" (1.588 " "m)   Physical Exam  Physical Exam            This note was generated with the assistance of ambient listening technology. Verbal consent was obtained by the patient and accompanying visitor(s) for the recording of patient appointment to facilitate this note. I attest to having reviewed and edited the generated note for accuracy, though some syntax or spelling errors may persist. Please contact the author of this note for any clarification.    Documentation entered by me for this encounter may have been done in part using speech-recognition technology. Although I have made an effort to ensure accuracy, "sound like" errors may exist and should be interpreted in context.      WRAP-UP INSTRUCTIONS  {LM Check Out Instructions:54172}    AT NEXT APPOINTMENT:  { Next Visit Plan:85406::"If meeting goals and doing well, anticipate continuing present treatment plan.","Ensure refills sufficient to last until next appointment.","If meeting goals and doing well and no new problems..."}  " "using spacer device with albuterol inhaler to improve medication delivery to lungs.   Discussed shingles vaccine, noting potential for mild side effects but significant protection against painful condition.   Mahogany to use Symbicort inhaler daily, especially during allergy seasons.   Mahogany to consider using Symbicort before feeling symptoms to maintain better control.   Mahogany to get shingles vaccine at pharmacy.   Mahogany to get flu vaccine.   Mahogany to stay current with COVID-19 vaccinations.   Continued atorvastatin at maximum dose.   Continued Symbicort 80/4.5 mcg inhaler, instructed to use daily.   Refilled albuterol inhaler with multiple refills, use as needed.   Discontinued pantoprazole, verapamil, Flovent HFA, Zetia, and Coenzyme Q10.   Ordered comprehensive metabolic panel.   Ordered A1C.   Ordered one-time screening for hepatitis C and HIV.   Ordered mammogram.   Offered referral to dermatologist to evaluate skin lesion near breast.   Follow up in 1 year for annual wellness visit.   Mahogany to sign release form for colonoscopy records from Dr. Jones at [facility].       1. Coronary artery disease involving native coronary artery of native heart without angina pectoris  Overview:  Cardiac Cath : "30% stenosis of some artery"  Cardiologist: Cuate Gomez MD  [address]  [address]   [place] [id number] [phone]       2. Familial heart disease  Overview:  Father had CABG at age 32 and  from MI age 52. Both daughters have severe hypercholesterolemia. Sister had CABG at age 48.      3. Familial hypercholesterolemia  Overview:  Cardiac Cath : "30% stenosis of some artery"  Cardiologist: Cuate Gomez MD  [facility] [address]  [address]   [place] [id number] [phone]     Orders:  -     Comprehensive Metabolic Panel; Future; Expected date: 10/31/2024    4. Mild intermittent asthma without " "complication  -     budesonide-formoterol 80-4.5 mcg (SYMBICORT) 80-4.5 mcg/actuation HFAA; Inhale 2 puffs into the lungs once daily. Controller  Dispense: 10.2 g; Refill: 11  -     albuterol (PROVENTIL HFA) 90 mcg/actuation inhaler; Inhale 2 puffs into the lungs every 6 (six) hours as needed for Wheezing. (Rescue inhaler)  Dispense: 18 g; Refill: 11  -     inhalation spacing device; Use as directed when taking inhaled medicine  Dispense: 1 each; Refill: 0    5. Skin lesion  -     Ambulatory referral/consult to Dermatology; Future; Expected date: 11/07/2024    6. Screening for HIV without presence of risk factors  -     HIV 1/2 Ag/Ab (4th Gen); Future; Expected date: 10/31/2024    7. Encounter for hepatitis C screening test for low risk patient  -     HEPATITIS C ANTIBODY; Future; Expected date: 10/31/2024    8. Breast cancer screening by mammogram  -     Mammo Digital Screening Bilat; Future; Expected date: 10/31/2024    9. Screening for diabetes mellitus  -     Hemoglobin A1C; Future; Expected date: 10/31/2024    10. Need for vaccination  -     varicella-zoster gE-AS01B, PF, (SHINGRIX) 50 mcg/0.5 mL injection; Inject 0.5 mL (one dose) into muscle now; schedule second dose  days later  Dispense: 1 each; Refill: 1    No other significant complaints or concerns were reported.  Vitals:    10/31/24 1408   BP: 116/62   BP Location: Left arm   Patient Position: Sitting   Pulse: 84   Temp: 97.9 °F (36.6 °C)   TempSrc: Tympanic   SpO2: 100%   Weight: 67.2 kg (148 lb 2.4 oz)   Height: 5' 2.5" (1.588 m)   Physical Exam  Vitals reviewed.   Constitutional:       General: She is not in acute distress.     Appearance: Normal appearance. She is not ill-appearing, toxic-appearing or diaphoretic.   HENT:      Head: Normocephalic and atraumatic.      Right Ear: Tympanic membrane, ear canal and external ear normal.      Left Ear: Tympanic membrane, ear canal and external ear normal.   Eyes:      General: No scleral icterus.     " "Conjunctiva/sclera: Conjunctivae normal.   Neck:      Thyroid: No thyroid mass, thyromegaly or thyroid tenderness.      Vascular: No carotid bruit.   Cardiovascular:      Rate and Rhythm: Normal rate and regular rhythm.      Heart sounds: Normal heart sounds.   Pulmonary:      Effort: Pulmonary effort is normal.      Breath sounds: Normal breath sounds.   Abdominal:      General: Bowel sounds are normal. There is no distension.      Palpations: Abdomen is soft. There is no mass.      Tenderness: There is no abdominal tenderness.   Musculoskeletal:         General: No tenderness.      Cervical back: No muscular tenderness.   Lymphadenopathy:      Cervical: No cervical adenopathy.   Skin:     General: Skin is warm and dry.      Coloration: Skin is not jaundiced.   Neurological:      General: No focal deficit present.      Mental Status: She is alert and oriented to person, place, and time. Mental status is at baseline.      Cranial Nerves: No cranial nerve deficit.      Gait: Gait normal.   Psychiatric:         Mood and Affect: Mood normal.         Behavior: Behavior normal.         Judgment: Judgment normal.       Physical Exam    Vitals: Blood pressure normal. BMI: 26.6.  Lungs: Lungs clear.  Cardiovascular: Heart regular. Carotid arteries normal.  Neck: Thyroid normal on palpation.  Abdomen: Normal bowel sounds. Abdomen normal on palpation.       This note was generated with the assistance of ambient listening technology. Verbal consent was obtained by the patient and accompanying visitor(s) for the recording of patient appointment to facilitate this note. I attest to having reviewed and edited the generated note for accuracy, though some syntax or spelling errors may persist. Please contact the author of this note for any clarification.    Documentation entered by me for this encounter may have been done in part using speech-recognition technology. Although I have made an effort to ensure accuracy, "sound like" " errors may exist and should be interpreted in context.

## 2024-11-01 ENCOUNTER — PATIENT MESSAGE (OUTPATIENT)
Dept: INTERNAL MEDICINE | Facility: CLINIC | Age: 54
End: 2024-11-01
Payer: COMMERCIAL

## 2024-11-01 DIAGNOSIS — R73.09 ELEVATED GLUCOSE: Primary | ICD-10-CM

## 2024-11-01 NOTE — PROGRESS NOTES
"Hi, Mahogany.    I'm happy to report that your recent test results are fine except that Your glucose is very elevated. The glucose is blood sugar. We did a test called A1C, which screens for diabetes, and it was normal. However, even if you had recently had something to eat, a glucose of 170 is high. This suggests you may have impaired glucose tolerance or pre-diabetes.     I've ordered a lab test called a "2-Hour Glucose Tolerance Test" to better evaluate you for diabetes and your risk of developing diabetes.    When you come in for this lab test, you need to be FASTING (nothing to eat or drink except for water or other calorie-free beverages) for the previous 8 hours.     Please take the time now to schedule a lab appointment for your 2-hour glucose tolerance test at your earliest convenience.    Here's how to schedule lab appointment using [facility]:  · From the [facility] Menu, choose "Schedule an Appointment."  · Under "Tell us why you are coming in" choose "Lab Services."  · Then choose the option, "My provider has placed electronic orders, and I will provide the name of my ordering physician or provider at check-in."  When you check-in for your lab appointment, tell them that you are there for the 2-hour glucose tolerance test I ordered 11/01/2024. (Better yet, you can show them this message.) If you have any difficulty, call our appointment desk at [phone], and they'll be glad to help.      In the meantime, please take time to learn about healthy eating habits at the website of the American Diabetes Association, www.diabetes.org    Dr. JOSE Mcduffie   "

## 2024-11-05 ENCOUNTER — PATIENT MESSAGE (OUTPATIENT)
Dept: ADMINISTRATIVE | Facility: HOSPITAL | Age: 54
End: 2024-11-05
Payer: COMMERCIAL

## 2024-11-06 DIAGNOSIS — Z12.11 SCREENING FOR COLON CANCER: ICD-10-CM

## 2024-11-12 DIAGNOSIS — J45.20 MILD INTERMITTENT ASTHMA WITHOUT COMPLICATION: Chronic | ICD-10-CM

## 2024-11-13 ENCOUNTER — HOSPITAL ENCOUNTER (OUTPATIENT)
Dept: RADIOLOGY | Facility: HOSPITAL | Age: 54
Discharge: HOME | End: 2024-11-13
Attending: FAMILY MEDICINE
Payer: COMMERCIAL

## 2024-11-13 DIAGNOSIS — Z12.31 BREAST CANCER SCREENING BY MAMMOGRAM: ICD-10-CM

## 2024-11-13 PROCEDURE — 77067 SCR MAMMO BI INCL CAD: CPT | Mod: 26,,, | Performed by: RADIOLOGY

## 2024-11-13 PROCEDURE — 77063 BREAST TOMOSYNTHESIS BI: CPT | Mod: 26,,, | Performed by: RADIOLOGY

## 2024-11-13 PROCEDURE — 77067 SCR MAMMO BI INCL CAD: CPT | Mod: TC

## 2024-12-23 ENCOUNTER — PATIENT MESSAGE (OUTPATIENT)
Dept: ADMINISTRATIVE | Facility: HOSPITAL | Age: 54
End: 2024-12-23
Payer: COMMERCIAL

## 2025-01-29 ENCOUNTER — OFFICE VISIT (OUTPATIENT)
Dept: DERMATOLOGY | Facility: CLINIC | Age: 55
End: 2025-01-29
Payer: COMMERCIAL

## 2025-01-29 DIAGNOSIS — L82.1 SEBORRHEIC KERATOSES: Primary | ICD-10-CM

## 2025-01-29 DIAGNOSIS — L98.9 SKIN LESION: ICD-10-CM

## 2025-01-29 DIAGNOSIS — L81.1 MELASMA: ICD-10-CM

## 2025-01-29 DIAGNOSIS — Z12.83 SCREENING, MALIGNANT NEOPLASM, SKIN: ICD-10-CM

## 2025-01-29 DIAGNOSIS — L81.4 LENTIGINES: ICD-10-CM

## 2025-01-29 PROCEDURE — G2211 COMPLEX E/M VISIT ADD ON: HCPCS | Mod: S$GLB,,, | Performed by: STUDENT IN AN ORGANIZED HEALTH CARE EDUCATION/TRAINING PROGRAM

## 2025-01-29 PROCEDURE — 1160F RVW MEDS BY RX/DR IN RCRD: CPT | Mod: CPTII,S$GLB,, | Performed by: STUDENT IN AN ORGANIZED HEALTH CARE EDUCATION/TRAINING PROGRAM

## 2025-01-29 PROCEDURE — 99999 PR PBB SHADOW E&M-EST. PATIENT-LVL III: CPT | Mod: PBBFAC,,, | Performed by: STUDENT IN AN ORGANIZED HEALTH CARE EDUCATION/TRAINING PROGRAM

## 2025-01-29 PROCEDURE — 1159F MED LIST DOCD IN RCRD: CPT | Mod: CPTII,S$GLB,, | Performed by: STUDENT IN AN ORGANIZED HEALTH CARE EDUCATION/TRAINING PROGRAM

## 2025-01-29 PROCEDURE — 99204 OFFICE O/P NEW MOD 45 MIN: CPT | Mod: S$GLB,,, | Performed by: STUDENT IN AN ORGANIZED HEALTH CARE EDUCATION/TRAINING PROGRAM

## 2025-01-29 RX ORDER — MONTELUKAST SODIUM 10 MG/1
10 TABLET ORAL NIGHTLY
COMMUNITY

## 2025-01-29 RX ORDER — BEMPEDOIC ACID 180 MG/1
TABLET, FILM COATED ORAL
COMMUNITY
Start: 2024-10-29

## 2025-01-29 RX ORDER — TRETINOIN 0.25 MG/G
CREAM TOPICAL NIGHTLY
Qty: 45 G | Refills: 2 | Status: SHIPPED | OUTPATIENT
Start: 2025-01-29

## 2025-01-29 NOTE — PROGRESS NOTES
Subjective:       Patient ID:  Mahogany Gaviria is a 54 y.o. female who presents for   Chief Complaint   Patient presents with    Spot     Spot of right breast x several years... debethany personal and family hx of skin cancer     History of Present Illness: The patient presents with chief complaint of brownish, crusted growth.  Location: right chest  Duration: present for months to year  Signs/Symptoms: brownish, scaly, crusted growth. Sometimes itchy and irritated  Prior treatments: none    Also reports having brown, scaly areas around the nose that come and go, mostly when directly in the sunlight.       Spot        Review of Systems   Constitutional:  Negative for fever and chills.   Skin:  Negative for itching, rash and dry skin.        Objective:    Physical Exam   Constitutional: She appears well-developed and well-nourished. No distress.   Neurological: She is alert and oriented to person, place, and time. She is not disoriented.   Psychiatric: She has a normal mood and affect.   Skin:   Areas Examined (abnormalities noted in diagram):   Head / Face Inspection Performed  Neck Inspection Performed  Chest / Axilla Inspection Performed  Abdomen Inspection Performed  Back Inspection Performed  RUE Inspected  LUE Inspection Performed                   Diagram Legend     Erythematous scaling macule/papule c/w actinic keratosis       Vascular papule c/w angioma      Pigmented verrucoid papule/plaque c/w seborrheic keratosis      Yellow umbilicated papule c/w sebaceous hyperplasia      Irregularly shaped tan macule c/w lentigo     1-2 mm smooth white papules consistent with Milia      Movable subcutaneous cyst with punctum c/w epidermal inclusion cyst      Subcutaneous movable cyst c/w pilar cyst      Firm pink to brown papule c/w dermatofibroma      Pedunculated fleshy papule(s) c/w skin tag(s)      Evenly pigmented macule c/w junctional nevus     Mildly variegated pigmented, slightly irregular-bordered macule c/w mildly  atypical nevus      Flesh colored to evenly pigmented papule c/w intradermal nevus       Pink pearly papule/plaque c/w basal cell carcinoma      Erythematous hyperkeratotic cursted plaque c/w SCC      Surgical scar with no sign of skin cancer recurrence      Open and closed comedones      Inflammatory papules and pustules      Verrucoid papule consistent consistent with wart     Erythematous eczematous patches and plaques     Dystrophic onycholytic nail with subungual debris c/w onychomycosis     Umbilicated papule    Erythematous-base heme-crusted tan verrucoid plaque consistent with inflamed seborrheic keratosis     Erythematous Silvery Scaling Plaque c/w Psoriasis     See annotation      Assessment / Plan:        Seborrheic keratoses  These are benign inherited growths without a malignant potential. Reassurance given to patient. No treatment is necessary.     Melasma  Lentigines  -     tretinoin (RETIN-A) 0.025 % cream; Apply topically every evening.  Dispense: 45 g; Refill: 2    Screening, malignant neoplasm, skin  upper body skin examination performed today including at least 6 points as noted in physical examination. No lesions suspicious for malignancy noted.  Reassurance provided.    Instructed patient to observe lesion(s) for changes and follow up in clinic if changes are noted. Patient to monitor skin at home for new or changing lesions and follow up in clinic if noted.           Follow up in about 1 year (around 1/29/2026).

## 2025-01-29 NOTE — PATIENT INSTRUCTIONS

## 2025-07-03 ENCOUNTER — TELEPHONE (OUTPATIENT)
Dept: INTERNAL MEDICINE | Facility: CLINIC | Age: 55
End: 2025-07-03
Payer: COMMERCIAL

## 2025-07-03 ENCOUNTER — PATIENT MESSAGE (OUTPATIENT)
Dept: INTERNAL MEDICINE | Facility: CLINIC | Age: 55
End: 2025-07-03
Payer: COMMERCIAL

## 2025-07-03 NOTE — TELEPHONE ENCOUNTER
Copied from CRM #[id number]. Topic: Medications - New Medication Request  >> Jul 3, 2025 10:32 AM Anna wrote:  Type:  Needs Medical Advice    Who Called: self  Symptoms (please be specific): pt would like to know if dr can refill rx for the generic medication for allegra, pt sts another dr prescribed it but wanted to know if he can prescribe it instead.    Pharmacy name and phone #:    [facility] - [place] - [id number]  [address]  [place] [address]  Phone: [phone] Fax: [phone]      Would the patient rather a call back or a response via [facility]? call  Best Call Back Number:  [phone]      Additional Information: please advise and thank you.

## 2025-07-03 NOTE — TELEPHONE ENCOUNTER
Called pt and advised sending a my chart e-visit request for her to discuss starting a new medication